# Patient Record
Sex: MALE | Race: WHITE | NOT HISPANIC OR LATINO | Employment: OTHER | URBAN - METROPOLITAN AREA
[De-identification: names, ages, dates, MRNs, and addresses within clinical notes are randomized per-mention and may not be internally consistent; named-entity substitution may affect disease eponyms.]

---

## 2018-05-03 ENCOUNTER — ESTABLISHED COMPREHENSIVE EXAM (OUTPATIENT)
Dept: URBAN - METROPOLITAN AREA CLINIC 39 | Facility: CLINIC | Age: 58
End: 2018-05-03

## 2018-05-03 DIAGNOSIS — H40.013: ICD-10-CM

## 2018-05-03 DIAGNOSIS — H02.33: ICD-10-CM

## 2018-05-03 DIAGNOSIS — H02.36: ICD-10-CM

## 2018-05-03 DIAGNOSIS — H25.812: ICD-10-CM

## 2018-05-03 DIAGNOSIS — H25.811: ICD-10-CM

## 2018-05-03 PROCEDURE — 92133 CPTRZD OPH DX IMG PST SGM ON: CPT

## 2018-05-03 PROCEDURE — 92014 COMPRE OPH EXAM EST PT 1/>: CPT

## 2018-05-03 PROCEDURE — 92015 DETERMINE REFRACTIVE STATE: CPT

## 2018-05-03 ASSESSMENT — VISUAL ACUITY
OD_CC: 20/20-2
OS_CC: J1+
OD_SC: J10
OS_SC: 20/100
OS_CC: 20/20-1
OD_CC: J1+
OS_SC: J10
OD_SC: 20/70

## 2018-05-03 ASSESSMENT — TONOMETRY
OS_IOP_MMHG: 13
OD_IOP_MMHG: 17

## 2018-10-30 ENCOUNTER — IOP CHECK (OUTPATIENT)
Dept: URBAN - METROPOLITAN AREA CLINIC 39 | Facility: CLINIC | Age: 58
End: 2018-10-30

## 2018-10-30 DIAGNOSIS — H40.013: ICD-10-CM

## 2018-10-30 PROCEDURE — 92012 INTRM OPH EXAM EST PATIENT: CPT

## 2018-10-30 PROCEDURE — 92083 EXTENDED VISUAL FIELD XM: CPT

## 2018-10-30 ASSESSMENT — VISUAL ACUITY
OS_CC: 20/20-1
OD_CC: 20/20

## 2018-10-30 ASSESSMENT — TONOMETRY
OS_IOP_MMHG: 16
OD_IOP_MMHG: 19

## 2019-05-29 ENCOUNTER — ESTABLISHED COMPREHENSIVE EXAM (OUTPATIENT)
Dept: URBAN - METROPOLITAN AREA CLINIC 39 | Facility: CLINIC | Age: 59
End: 2019-05-29

## 2019-05-29 DIAGNOSIS — H25.812: ICD-10-CM

## 2019-05-29 DIAGNOSIS — H40.013: ICD-10-CM

## 2019-05-29 DIAGNOSIS — H25.811: ICD-10-CM

## 2019-05-29 PROCEDURE — 92015 DETERMINE REFRACTIVE STATE: CPT

## 2019-05-29 PROCEDURE — 92014 COMPRE OPH EXAM EST PT 1/>: CPT

## 2019-05-29 PROCEDURE — 92133 CPTRZD OPH DX IMG PST SGM ON: CPT

## 2019-05-29 ASSESSMENT — VISUAL ACUITY
OD_SC: J10
OS_CC: J1+
OD_BAT: 20/400 W/MR
OD_CC: 20/25
OD_SC: 20/80-1
OS_CC: 20/25-1
OS_BAT: 20/70 W/MR
OD_CC: J1+
OS_SC: J10
OS_SC: 20/100-1

## 2019-05-29 ASSESSMENT — TONOMETRY
OS_IOP_MMHG: 14
OD_IOP_MMHG: 16

## 2020-03-10 NOTE — PATIENT DISCUSSION
We will obtain a Retinal Consult before Cataract Surgery on the left eye. We will get this between the two eyes with Dr. Demetri Zazueta. Patient prefers to have the Retinal Consult before he schedules Cataract Surgery for the right eye.

## 2020-06-02 ENCOUNTER — ESTABLISHED COMPREHENSIVE EXAM (OUTPATIENT)
Dept: URBAN - METROPOLITAN AREA CLINIC 39 | Facility: CLINIC | Age: 60
End: 2020-06-02

## 2020-06-02 DIAGNOSIS — H40.013: ICD-10-CM

## 2020-06-02 DIAGNOSIS — H25.812: ICD-10-CM

## 2020-06-02 DIAGNOSIS — H25.811: ICD-10-CM

## 2020-06-02 PROCEDURE — 92014 COMPRE OPH EXAM EST PT 1/>: CPT

## 2020-06-02 PROCEDURE — 92083 EXTENDED VISUAL FIELD XM: CPT

## 2020-06-02 PROCEDURE — 92015 DETERMINE REFRACTIVE STATE: CPT

## 2020-06-02 ASSESSMENT — TONOMETRY
OS_IOP_MMHG: 17
OD_IOP_MMHG: 17

## 2020-06-02 ASSESSMENT — VISUAL ACUITY
OS_SC: 20/200
OD_SC: 20/70-1
OD_SC: J10
OD_CC: J1
OS_CC: 20/20
OS_CC: J1
OU_CC: 20/20
OD_CC: 20/20-1
OS_SC: J10

## 2020-09-21 NOTE — PATIENT DISCUSSION
Patient advised of the right to post-operative care by the surgeon. Patient is fully informed of, and agreed to, co-management with their primary optometric physician. Post-operative care by the surgeon is not medically necessary and co-management is clinically appropriate. Patient has received itemization of fees related to cataract surgery. Transfer of care letter completed for the patient. Transfer care of the Right eye to Dr. Jayro Junior on 09/21/2020. Patient instructed to call immediately if any new distortion, blurring, decreased vision or eye pain.

## 2020-09-28 NOTE — PATIENT DISCUSSION
Cataract surgery has been performed in the first eye and activities of daily living are still impaired. The patient would like to proceed with cataract surgery in the second eye as scheduled. The patient elects Basic Plus IOL OS, goal jagdish.

## 2021-06-02 ENCOUNTER — ESTABLISHED COMPREHENSIVE EXAM (OUTPATIENT)
Dept: URBAN - METROPOLITAN AREA CLINIC 39 | Facility: CLINIC | Age: 61
End: 2021-06-02

## 2021-06-02 DIAGNOSIS — H25.812: ICD-10-CM

## 2021-06-02 DIAGNOSIS — H40.013: ICD-10-CM

## 2021-06-02 DIAGNOSIS — H25.811: ICD-10-CM

## 2021-06-02 PROCEDURE — 92014 COMPRE OPH EXAM EST PT 1/>: CPT

## 2021-06-02 PROCEDURE — 92015 DETERMINE REFRACTIVE STATE: CPT

## 2021-06-02 PROCEDURE — 92250 FUNDUS PHOTOGRAPHY W/I&R: CPT

## 2021-06-02 ASSESSMENT — VISUAL ACUITY
OS_SC: 20/200
OD_SC: 20/60
OS_SC: J10
OS_CC: 20/25
OD_CC: J1
OS_CC: J1
OD_CC: 20/20
OD_SC: J10

## 2021-06-02 ASSESSMENT — TONOMETRY
OS_IOP_MMHG: 18
OD_IOP_MMHG: 18

## 2021-09-24 NOTE — PATIENT DISCUSSION
No DME, not active, Discussed the importance of blood sugar control in the prevention of ocular complications.

## 2021-09-24 NOTE — PROCEDURE NOTE: CLINICAL
PROCEDURE NOTE: Lucentis 0.5 mg OS. Diagnosis: Neovascular AMD with Active CNV. Anesthesia: Topical. Prep: Betadine Drops and Scrubs. Prior to injection, risks/benefits/alternatives discussed including infection, loss of vision, hemorrhage, cataract, glaucoma, retinal tears or detachment and patient wished to proceed. Informed consent obtained. . Patient was advised the purpose of the treatment was to slow the progression of the disease, and may not improve visual acuity. Betadine prep was performed. Injection site: 3-4 mm from the limbus. Mask worn during procedure. A lid speculum was used. Intravitreal injection of Lucentis 0.5mg/0.05 ml was given. Discarded remaining *. CRA perfusion confirmed. The eye was irrigated with sterile eye rinse solution. The betadine was washed away. Count fingers vision was verified. The patient tolerated the procedure well and there were no complications from the procedure. Post procedure instructions given. Patient given office phone number/answering service number and advised to call immediately should there be an increase in floaters or redness, loss of vision or pain, or should they have any other questions or concerns. Patient was given the standard instruction sheet. Vinod Ashford

## 2021-11-05 NOTE — PROCEDURE NOTE: CLINICAL
PROCEDURE NOTE: Lucentis 0.5 mg OS. Diagnosis: Neovascular AMD with Active CNV. Anesthesia: Topical. Prep: Betadine Drops and Scrubs. Prior to injection, risks/benefits/alternatives discussed including infection, loss of vision, hemorrhage, cataract, glaucoma, retinal tears or detachment and patient wished to proceed. Informed consent obtained. . Patient was advised the purpose of the treatment was to slow the progression of the disease, and may not improve visual acuity. Betadine prep was performed. Injection site: 3-4 mm from the limbus. Mask worn during procedure. A lid speculum was used. Intravitreal injection of Lucentis 0.5mg/0.05 ml was given. Discarded remaining *. CRA perfusion confirmed. The eye was irrigated with sterile eye rinse solution. The betadine was washed away. Count fingers vision was verified. The patient tolerated the procedure well and there were no complications from the procedure. Post procedure instructions given. Patient given office phone number/answering service number and advised to call immediately should there be an increase in floaters or redness, loss of vision or pain, or should they have any other questions or concerns. Patient was given the standard instruction sheet. Arvind Ortega

## 2022-01-07 NOTE — PROCEDURE NOTE: CLINICAL
PROCEDURE NOTE: Lucentis 0.5 mg OS. Diagnosis: Neovascular AMD with Active CNV. Anesthesia: Topical. Prep: Betadine Drops and Scrubs. Prior to injection, risks/benefits/alternatives discussed including infection, loss of vision, hemorrhage, cataract, glaucoma, retinal tears or detachment and patient wished to proceed. Informed consent obtained. . Patient was advised the purpose of the treatment was to slow the progression of the disease, and may not improve visual acuity. Betadine prep was performed. Injection site: 3-4 mm from the limbus. Mask worn during procedure. A lid speculum was used. Intravitreal injection of Lucentis 0.5mg/0.05 ml was given. Discarded remaining *. CRA perfusion confirmed. The eye was irrigated with sterile eye rinse solution. The betadine was washed away. Count fingers vision was verified. The patient tolerated the procedure well and there were no complications from the procedure. Post procedure instructions given. Patient given office phone number/answering service number and advised to call immediately should there be an increase in floaters or redness, loss of vision or pain, or should they have any other questions or concerns. Patient was given the standard instruction sheet. Gladis Ramesh

## 2022-06-08 ENCOUNTER — COMPREHENSIVE EXAM (OUTPATIENT)
Dept: URBAN - METROPOLITAN AREA CLINIC 39 | Facility: CLINIC | Age: 62
End: 2022-06-08

## 2022-06-08 DIAGNOSIS — H52.203: ICD-10-CM

## 2022-06-08 DIAGNOSIS — H52.03: ICD-10-CM

## 2022-06-08 DIAGNOSIS — H25.813: ICD-10-CM

## 2022-06-08 DIAGNOSIS — H40.013: ICD-10-CM

## 2022-06-08 PROCEDURE — 92015 DETERMINE REFRACTIVE STATE: CPT

## 2022-06-08 PROCEDURE — 92014 COMPRE OPH EXAM EST PT 1/>: CPT

## 2022-06-08 ASSESSMENT — VISUAL ACUITY
OS_CC: J1
OD_CC: 20/25+2
OS_SC: <J12
OD_CC: J1
OD_SC: 20/50+1
OS_SC: 20/200
OS_CC: 20/20-1
OD_SC: <J12

## 2022-06-08 ASSESSMENT — TONOMETRY
OS_IOP_MMHG: 16
OD_IOP_MMHG: 16

## 2022-06-10 NOTE — PROCEDURE NOTE: CLINICAL
PROCEDURE NOTE: Lucentis 0.5 mg OS. Diagnosis: Neovascular AMD with Active CNV. Anesthesia: Topical. Prep: Betadine Drops and Scrubs. Prior to injection, risks/benefits/alternatives discussed including infection, loss of vision, hemorrhage, cataract, glaucoma, retinal tears or detachment and patient wished to proceed. Informed consent obtained. . Patient was advised the purpose of the treatment was to slow the progression of the disease, and may not improve visual acuity. Betadine prep was performed. Injection site: 3-4 mm from the limbus. Mask worn during procedure. A lid speculum was used. Intravitreal injection of Lucentis 0.5mg/0.05 ml was given. Discarded remaining *. CRA perfusion confirmed. The eye was irrigated with sterile eye rinse solution. The betadine was washed away. Count fingers vision was verified. The patient tolerated the procedure well and there were no complications from the procedure. Post procedure instructions given. Patient given office phone number/answering service number and advised to call immediately should there be an increase in floaters or redness, loss of vision or pain, or should they have any other questions or concerns. Patient was given the standard instruction sheet. [facility]

## 2022-06-10 NOTE — PATIENT DISCUSSION
An examination that was significantly and separately identifiable from the procedure performed today was also completed for Hypertensive retinopathy.

## 2022-08-19 NOTE — PROCEDURE NOTE: CLINICAL
PROCEDURE NOTE: Lucentis 0.5 mg OS. Diagnosis: Neovascular AMD with Active CNV. Anesthesia: Topical. Prep: Betadine Drops and Scrubs. Prior to injection, risks/benefits/alternatives discussed including infection, loss of vision, hemorrhage, cataract, glaucoma, retinal tears or detachment and patient wished to proceed. Informed consent obtained. . Patient was advised the purpose of the treatment was to slow the progression of the disease, and may not improve visual acuity. Betadine prep was performed. Injection site: 3-4 mm from the limbus. Mask worn during procedure. A lid speculum was used. Intravitreal injection of Lucentis 0.5mg/0.05 ml was given. Discarded remaining *. CRA perfusion confirmed. The eye was irrigated with sterile eye rinse solution. The betadine was washed away. Count fingers vision was verified. The patient tolerated the procedure well and there were no complications from the procedure. Post procedure instructions given. Patient given office phone number/answering service number and advised to call immediately should there be an increase in floaters or redness, loss of vision or pain, or should they have any other questions or concerns. Patient was given the standard instruction sheet. Marcel Messer

## 2022-10-24 NOTE — PROCEDURE NOTE: CLINICAL
PROCEDURE NOTE: Lucentis 0.5 mg OS. Diagnosis: Neovascular AMD with Active CNV. Anesthesia: Topical. Prep: Betadine Drops and Scrubs. Prior to injection, risks/benefits/alternatives discussed including infection, loss of vision, hemorrhage, cataract, glaucoma, retinal tears or detachment and patient wished to proceed. Informed consent obtained. . Patient was advised the purpose of the treatment was to slow the progression of the disease, and may not improve visual acuity. Betadine prep was performed. Injection site: 3-4 mm from the limbus. Mask worn during procedure. A lid speculum was used. Intravitreal injection of Lucentis 0.5mg/0.05 ml was given. Discarded remaining *. CRA perfusion confirmed. The eye was irrigated with sterile eye rinse solution. The betadine was washed away. Count fingers vision was verified. The patient tolerated the procedure well and there were no complications from the procedure. Post procedure instructions given. Patient given office phone number/answering service number and advised to call immediately should there be an increase in floaters or redness, loss of vision or pain, or should they have any other questions or concerns. Patient was given the standard instruction sheet. Colby Briscoe

## 2022-10-24 NOTE — PATIENT DISCUSSION
Recommended seeing primary eye care provider for refraction or updated prescription. Regular rate and rhythm, Heart sounds S1 S2 present, no murmurs, rubs or gallops

## 2022-10-24 NOTE — PATIENT DISCUSSION
An examination that was significantly and separately identifiable from the procedure performed today was also completed for Dry AMD.

## 2022-11-03 NOTE — PATIENT DISCUSSION
Patient understands condition, prognosis and need for follow up care. Patient understands there is an increased risk of corneal edema after cataract surgery.

## 2023-01-04 NOTE — PROCEDURE NOTE: CLINICAL
PROCEDURE NOTE: Lucentis 0.5 mg OS. Diagnosis: Neovascular AMD with Active CNV. Anesthesia: Topical. Prep: Betadine Drops and Scrubs. The patient was identified visually and verbally by the  surgeon. The procedure eye was marked with an adhesive arrow sticker. The  risks, benefits, alternatives and possible complications of the procedure  were discussed with the patient and informed consent was obtained. The  patient was positioned in the chair. Proparacaine, Betadine, Akten administered prior to injection. Additional Betadine was used. A speculum  was used to hold the eyelid open. A caliper was used to marked the site of  injection 3.5-4mm from the limbus. Betadine was placed on the site . A sterile 30 or 31 gauge needle with the  medication entered the vitreous cavity and the medication was  administered. The speculum was removed. The patient was instructed to call immediately if any  significant visual loss, swelling, discharge, or pain occurred Intravitreal injection. Patient tolerated the procedure well. There were no complications. CF vision checked. Post procedure instructions given. Queen Eber

## 2023-06-07 ENCOUNTER — COMPREHENSIVE EXAM (OUTPATIENT)
Dept: URBAN - METROPOLITAN AREA CLINIC 39 | Facility: CLINIC | Age: 63
End: 2023-06-07

## 2023-06-07 DIAGNOSIS — H40.013: ICD-10-CM

## 2023-06-07 DIAGNOSIS — H52.03: ICD-10-CM

## 2023-06-07 DIAGNOSIS — H52.203: ICD-10-CM

## 2023-06-07 DIAGNOSIS — H25.813: ICD-10-CM

## 2023-06-07 PROCEDURE — 92083 EXTENDED VISUAL FIELD XM: CPT

## 2023-06-07 PROCEDURE — 92014 COMPRE OPH EXAM EST PT 1/>: CPT

## 2023-06-07 PROCEDURE — 92015 DETERMINE REFRACTIVE STATE: CPT

## 2023-06-07 ASSESSMENT — VISUAL ACUITY
OS_SC: <J10
OD_CC: J1+
OU_SC: <J10
OD_SC: 20/60+2
OD_CC: 20/20-1
OS_CC: 20/20-1
OD_SC: <J10
OS_SC: 20/200
OU_SC: 20/50-1
OU_CC: 20/20
OS_CC: J1+
OU_CC: J1+

## 2023-06-07 ASSESSMENT — TONOMETRY
OD_IOP_MMHG: 15
OS_IOP_MMHG: 15

## 2024-06-12 ENCOUNTER — COMPREHENSIVE EXAM (OUTPATIENT)
Dept: URBAN - METROPOLITAN AREA CLINIC 39 | Facility: CLINIC | Age: 64
End: 2024-06-12

## 2024-06-12 DIAGNOSIS — H52.03: ICD-10-CM

## 2024-06-12 DIAGNOSIS — H40.013: ICD-10-CM

## 2024-06-12 DIAGNOSIS — H52.203: ICD-10-CM

## 2024-06-12 DIAGNOSIS — H25.813: ICD-10-CM

## 2024-06-12 PROCEDURE — 92250 FUNDUS PHOTOGRAPHY W/I&R: CPT | Mod: 25

## 2024-06-12 PROCEDURE — 92083 EXTENDED VISUAL FIELD XM: CPT | Mod: 25

## 2024-06-12 PROCEDURE — 92014 COMPRE OPH EXAM EST PT 1/>: CPT

## 2024-06-12 PROCEDURE — 92015 DETERMINE REFRACTIVE STATE: CPT

## 2024-06-12 ASSESSMENT — VISUAL ACUITY
OD_SC: J12
OS_CC: 20/20-1
OS_SC: J12
OS_CC: J2
OD_CC: J1
OS_SC: 20/80-1
OD_SC: 20/40
OD_CC: 20/20-1

## 2024-06-12 ASSESSMENT — TONOMETRY
OD_IOP_MMHG: 14
OS_IOP_MMHG: 12

## 2025-07-29 ENCOUNTER — COMPREHENSIVE EXAM (OUTPATIENT)
Age: 65
End: 2025-07-29

## 2025-07-29 DIAGNOSIS — H25.813: ICD-10-CM

## 2025-07-29 DIAGNOSIS — H52.03: ICD-10-CM

## 2025-07-29 DIAGNOSIS — H40.013: ICD-10-CM

## 2025-07-29 DIAGNOSIS — H52.203: ICD-10-CM

## 2025-07-29 PROCEDURE — 92083 EXTENDED VISUAL FIELD XM: CPT | Mod: 25

## 2025-07-29 PROCEDURE — 92015 DETERMINE REFRACTIVE STATE: CPT

## 2025-07-29 PROCEDURE — 92014 COMPRE OPH EXAM EST PT 1/>: CPT
